# Patient Record
Sex: MALE | Race: BLACK OR AFRICAN AMERICAN | NOT HISPANIC OR LATINO
[De-identification: names, ages, dates, MRNs, and addresses within clinical notes are randomized per-mention and may not be internally consistent; named-entity substitution may affect disease eponyms.]

---

## 2024-07-26 ENCOUNTER — APPOINTMENT (OUTPATIENT)
Dept: GASTROENTEROLOGY | Facility: CLINIC | Age: 54
End: 2024-07-26
Payer: COMMERCIAL

## 2024-07-26 VITALS
HEIGHT: 68 IN | BODY MASS INDEX: 28.79 KG/M2 | SYSTOLIC BLOOD PRESSURE: 123 MMHG | OXYGEN SATURATION: 98 % | WEIGHT: 190 LBS | DIASTOLIC BLOOD PRESSURE: 86 MMHG | HEART RATE: 81 BPM

## 2024-07-26 DIAGNOSIS — Z12.11 ENCOUNTER FOR SCREENING FOR MALIGNANT NEOPLASM OF COLON: ICD-10-CM

## 2024-07-26 DIAGNOSIS — Z86.010 PERSONAL HISTORY OF COLONIC POLYPS: ICD-10-CM

## 2024-07-26 PROBLEM — Z00.00 ENCOUNTER FOR PREVENTIVE HEALTH EXAMINATION: Status: ACTIVE | Noted: 2024-07-26

## 2024-07-26 PROCEDURE — 99214 OFFICE O/P EST MOD 30 MIN: CPT

## 2024-07-26 NOTE — HISTORY OF PRESENT ILLNESS
[FreeTextEntry1] : Chief complaint: encounter for colon cancer screening HPI: Patient presents for Gastroenterology evaluation and mangaement of multiple Gastrointestinal issues. Patient with postprandial bloating and alteration in bowel habits He has prior history of villous adenoma of the colon, and he is due for colonoscopy.   Patient with no dyspepsia, indigestion, acid reflux or dyspepsia   Patient with no acute gi bleeding; no hematemesis, melena or hematchezia   Will schedule patient for screening colonoscopy at a [facility]. The risks, benefits, alternatives of the procedure were reviewed with patient who gives informed consent

## 2024-07-26 NOTE — ASSESSMENT
[FreeTextEntry1] : Patient presents for Gastroenterology evaluation because of alteration in bowel habits, personal history of adenomatous polyp of colon  will schedule patient for screening colonoscopy at [facility]. The risks, benefits, alternatives of the procedure were rreviewed with patient who gives informed consent

## 2024-07-26 NOTE — PHYSICAL EXAM

## 2024-09-24 ENCOUNTER — APPOINTMENT (OUTPATIENT)
Dept: GASTROENTEROLOGY | Facility: GI CENTER | Age: 54
End: 2024-09-24
Payer: COMMERCIAL

## 2024-09-24 ENCOUNTER — RESULT REVIEW (OUTPATIENT)
Age: 54
End: 2024-09-24

## 2024-09-24 PROCEDURE — 45380 COLONOSCOPY AND BIOPSY: CPT
